# Patient Record
Sex: FEMALE | Race: WHITE | NOT HISPANIC OR LATINO | Employment: FULL TIME | URBAN - METROPOLITAN AREA
[De-identification: names, ages, dates, MRNs, and addresses within clinical notes are randomized per-mention and may not be internally consistent; named-entity substitution may affect disease eponyms.]

---
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
## 2020-06-16 ENCOUNTER — HOSPITAL ENCOUNTER (OUTPATIENT)
Dept: ULTRASOUND IMAGING | Facility: HOSPITAL | Age: 20
Discharge: HOME | End: 2020-06-16
Admitting: INTERNAL MEDICINE

## 2020-06-16 DIAGNOSIS — E06.3 HASHIMOTO'S DISEASE: ICD-10-CM

## 2020-06-16 DIAGNOSIS — E04.2 NONTOXIC MULTINODULAR GOITER: ICD-10-CM

## 2020-06-16 PROCEDURE — 76536 US EXAM OF HEAD AND NECK: CPT

## 2020-06-18 ENCOUNTER — TELEPHONE (OUTPATIENT)
Dept: ENDOCRINOLOGY | Age: 20
End: 2020-06-18

## 2020-06-18 NOTE — TELEPHONE ENCOUNTER
PATIENT LEFT VOICE MAIL   I LEFT MESSAGE FOR HER TO RETURN MY CALL   SHE NEEDS TO RESCHEDULE HER JO ANN APPOINTMENT    THE VOICE MAIL SAID ELLIOT    [phone]

## 2020-06-22 DIAGNOSIS — E06.3 HASHIMOTO'S DISEASE: ICD-10-CM

## 2020-06-22 DIAGNOSIS — E04.2 NONTOXIC MULTINODULAR GOITER: Primary | ICD-10-CM

## 2020-06-22 DIAGNOSIS — E05.20 TOXIC MULTINODUL GOITER: ICD-10-CM

## 2020-07-16 ENCOUNTER — TELEPHONE (OUTPATIENT)
Dept: ENDOCRINOLOGY | Age: 20
End: 2020-07-16

## 2020-07-16 NOTE — TELEPHONE ENCOUNTER
Pt called left a v/m and asked if she could get covid test on her labs that she has Monday she called it anibodies please let me know if this is ok to do.

## 2020-07-21 LAB
ALBUMIN SERPL-MCNC: 4.7 G/DL (ref 3.5–5.2)
ALBUMIN/GLOB SERPL: 2 G/DL
ALP SERPL-CCNC: 71 U/L (ref 39–117)
ALT SERPL-CCNC: 6 U/L (ref 1–33)
AST SERPL-CCNC: 10 U/L (ref 1–32)
BILIRUB SERPL-MCNC: 0.7 MG/DL (ref 0–1.2)
BUN SERPL-MCNC: 9 MG/DL (ref 6–20)
BUN/CREAT SERPL: 11.8 (ref 7–25)
CALCIUM SERPL-MCNC: 9.5 MG/DL (ref 8.6–10.5)
CHLORIDE SERPL-SCNC: 103 MMOL/L (ref 98–107)
CO2 SERPL-SCNC: 24.9 MMOL/L (ref 22–29)
CREAT SERPL-MCNC: 0.76 MG/DL (ref 0.57–1)
GLOBULIN SER CALC-MCNC: 2.3 GM/DL
GLUCOSE SERPL-MCNC: 90 MG/DL (ref 65–99)
POTASSIUM SERPL-SCNC: 3.9 MMOL/L (ref 3.5–5.2)
PROT SERPL-MCNC: 7 G/DL (ref 6–8.5)
SODIUM SERPL-SCNC: 138 MMOL/L (ref 136–145)
T3 SERPL-MCNC: 126 NG/DL (ref 80–200)
T4 FREE SERPL-MCNC: 1.43 NG/DL (ref 0.93–1.7)
THYROPEROXIDASE AB SERPL-ACNC: 28 IU/ML (ref 0–34)
TSH SERPL DL<=0.005 MIU/L-ACNC: 1.81 UIU/ML (ref 0.27–4.2)

## 2020-07-31 ENCOUNTER — OFFICE VISIT (OUTPATIENT)
Dept: ENDOCRINOLOGY | Age: 20
End: 2020-07-31

## 2020-07-31 VITALS
OXYGEN SATURATION: 99 % | WEIGHT: 140.6 LBS | BODY MASS INDEX: 20.83 KG/M2 | RESPIRATION RATE: 20 BRPM | HEART RATE: 83 BPM | SYSTOLIC BLOOD PRESSURE: 110 MMHG | HEIGHT: 69 IN | DIASTOLIC BLOOD PRESSURE: 50 MMHG

## 2020-07-31 DIAGNOSIS — E04.2 MULTINODULAR GOITER: ICD-10-CM

## 2020-07-31 DIAGNOSIS — E06.3 HASHIMOTO'S DISEASE: Primary | ICD-10-CM

## 2020-07-31 PROCEDURE — 99214 OFFICE O/P EST MOD 30 MIN: CPT | Performed by: INTERNAL MEDICINE

## 2020-08-05 ENCOUNTER — RESULTS ENCOUNTER (OUTPATIENT)
Dept: ENDOCRINOLOGY | Age: 20
End: 2020-08-05

## 2020-08-05 DIAGNOSIS — E06.3 HASHIMOTO'S DISEASE: ICD-10-CM

## 2020-08-05 DIAGNOSIS — E05.20 TOXIC MULTINODUL GOITER: ICD-10-CM

## 2020-08-05 PROBLEM — E04.2 MULTINODULAR GOITER: Status: ACTIVE | Noted: 2019-01-07

## 2020-09-18 ENCOUNTER — RESULTS ENCOUNTER (OUTPATIENT)
Dept: ENDOCRINOLOGY | Age: 20
End: 2020-09-18

## 2020-09-18 DIAGNOSIS — E06.3 HASHIMOTO'S DISEASE: ICD-10-CM

## 2021-04-16 ENCOUNTER — BULK ORDERING (OUTPATIENT)
Dept: CASE MANAGEMENT | Facility: OTHER | Age: 21
End: 2021-04-16

## 2021-04-16 DIAGNOSIS — Z23 IMMUNIZATION DUE: ICD-10-CM

## 2021-07-26 ENCOUNTER — OFFICE VISIT (OUTPATIENT)
Dept: ENDOCRINOLOGY | Age: 21
End: 2021-07-26

## 2021-07-26 VITALS
SYSTOLIC BLOOD PRESSURE: 94 MMHG | HEIGHT: 69 IN | HEART RATE: 83 BPM | OXYGEN SATURATION: 98 % | DIASTOLIC BLOOD PRESSURE: 80 MMHG | BODY MASS INDEX: 22.01 KG/M2 | WEIGHT: 148.6 LBS

## 2021-07-26 DIAGNOSIS — E04.2 MULTINODULAR GOITER: ICD-10-CM

## 2021-07-26 DIAGNOSIS — E06.3 HASHIMOTO'S DISEASE: Primary | ICD-10-CM

## 2021-07-26 PROCEDURE — 99214 OFFICE O/P EST MOD 30 MIN: CPT | Performed by: INTERNAL MEDICINE

## 2021-07-26 NOTE — PROGRESS NOTES
"Chief Complaint  Chief Complaint   Patient presents with   • Hashimoto's Thyroiditis   FOLLOW UP/ HYPOTHYROIDISM    Subjective          History of Present Illness    Alaina Young 21 y.o. presents as a F/u patient for the evaluation of Hypothyroidism.     Patient used to see Dr. Lanza in the past, transferred the care to me today.    Not on any thyroid medication. Pt does have TPO antibodies, hence the diagnosis of Hashimoto's disease.  She also underwent serial thyroid ultrasounds to monitor the thyroid nodules.  Never had a biopsy.    Today in clinic she reports that her energy levels are okay, weight has been stable.  No other significant hyper or hypothyroid symptoms.  Denied family history of thyroid disease.    She is on birth control pills which has been helping with her menstrual cycles.      Reviewed primary care physician's/consulting physician documentation and lab results         I have reviewed the patient's allergies, medicines, past medical hx, family hx and social hx in detail.    Objective   Vital Signs:   BP 94/80   Pulse 83   Ht 175.3 cm (69\")   Wt 67.4 kg (148 lb 9.6 oz)   SpO2 98%   BMI 21.94 kg/m²   Physical Exam   General appearance - no distress  Eyes- anicteric sclera  Ear nose and throat-external ears and nose normal.    Respiratory-normal chest on inspection.  No respiratory distress noted.  Skin-no rashes.  Neuro-alert and oriented x3          Result Review :   The following data was reviewed by: Dulce Hartmann MD on 07/26/2021:  Orders Only on 06/22/2020   Component Date Value Ref Range Status   • TSH 07/20/2020 1.810  0.270 - 4.200 uIU/mL Final   • Glucose 07/20/2020 90  65 - 99 mg/dL Final   • BUN 07/20/2020 9  6 - 20 mg/dL Final   • Creatinine 07/20/2020 0.76  0.57 - 1.00 mg/dL Final   • eGFR Non  Am 07/20/2020 97  >60 mL/min/1.73 Final   • eGFR African Am 07/20/2020 118  >60 mL/min/1.73 Final   • BUN/Creatinine Ratio 07/20/2020 11.8  7.0 - 25.0 Final   • Sodium " 07/20/2020 138  136 - 145 mmol/L Final   • Potassium 07/20/2020 3.9  3.5 - 5.2 mmol/L Final   • Chloride 07/20/2020 103  98 - 107 mmol/L Final   • Total CO2 07/20/2020 24.9  22.0 - 29.0 mmol/L Final   • Calcium 07/20/2020 9.5  8.6 - 10.5 mg/dL Final   • Total Protein 07/20/2020 7.0  6.0 - 8.5 g/dL Final   • Albumin 07/20/2020 4.70  3.50 - 5.20 g/dL Final   • Globulin 07/20/2020 2.3  gm/dL Final   • A/G Ratio 07/20/2020 2.0  g/dL Final   • Total Bilirubin 07/20/2020 0.7  0.0 - 1.2 mg/dL Final   • Alkaline Phosphatase 07/20/2020 71  39 - 117 U/L Final   • AST (SGOT) 07/20/2020 10  1 - 32 U/L Final   • ALT (SGPT) 07/20/2020 6  1 - 33 U/L Final   • T3, Total 07/20/2020 126.0  80.0 - 200.0 ng/dl Final    Results may be falsely increased if patient taking Biotin.   • Free T4 07/20/2020 1.43  0.93 - 1.70 ng/dL Final    Results may be falsely increased if patient taking Biotin.   • Thyroid Peroxidase Antibody 07/20/2020 28  0 - 34 IU/mL Final     Data reviewed: Dr. Lanza documentation       Results Review:    I reviewed the patient's new clinical results.     Assessment and Plan    Problem List Items Addressed This Visit        Other    Hashimoto's disease - Primary    Relevant Orders    TSH    T4, Free    T3, Free    Vitamin D 25 Hydroxy    Basic Metabolic Panel    US Thyroid    Multinodular goiter    Relevant Orders    TSH    T4, Free    T3, Free    Vitamin D 25 Hydroxy    Basic Metabolic Panel    US Thyroid        Multinodular goiter  Proceed with a thyroid ultrasound  Reviewed the prior thyroid ultrasound from 2020.    Hashimoto's disease  Check thyroid panel.    Check vitamin D levels.    All the above problems are new for me  Interpreted the blood work-up/imaging results performed by the primary care/consulting physician -    Refills sent to pharmacy    Follow Up     Patient was given instructions and counseling regarding her condition or for health maintenance advice. Please see specific information pulled into the  "AVS if appropriate.       Thank you for asking me to see your patient, Alaina Young in consultation.         Dulce Hartmann MD  07/26/21      EMR Dragon / transcription disclaimer:     \"Dictated utilizing Dragon dictation\".              "

## 2021-07-27 LAB
25(OH)D3+25(OH)D2 SERPL-MCNC: 37.7 NG/ML (ref 30–100)
BUN SERPL-MCNC: 11 MG/DL (ref 6–20)
BUN/CREAT SERPL: 16.2 (ref 7–25)
CALCIUM SERPL-MCNC: 9.7 MG/DL (ref 8.6–10.5)
CHLORIDE SERPL-SCNC: 104 MMOL/L (ref 98–107)
CO2 SERPL-SCNC: 23.8 MMOL/L (ref 22–29)
CREAT SERPL-MCNC: 0.68 MG/DL (ref 0.57–1)
GLUCOSE SERPL-MCNC: 79 MG/DL (ref 65–99)
POTASSIUM SERPL-SCNC: 4.4 MMOL/L (ref 3.5–5.2)
SODIUM SERPL-SCNC: 141 MMOL/L (ref 136–145)
T3FREE SERPL-MCNC: 3 PG/ML (ref 2–4.4)
T4 FREE SERPL-MCNC: 1.05 NG/DL (ref 0.93–1.7)
TSH SERPL DL<=0.005 MIU/L-ACNC: 1.54 UIU/ML (ref 0.27–4.2)

## 2021-08-18 ENCOUNTER — HOSPITAL ENCOUNTER (OUTPATIENT)
Dept: ULTRASOUND IMAGING | Facility: HOSPITAL | Age: 21
Discharge: HOME | End: 2021-08-18
Admitting: INTERNAL MEDICINE

## 2021-08-18 DIAGNOSIS — E06.3 HASHIMOTO'S DISEASE: ICD-10-CM

## 2021-08-18 DIAGNOSIS — E04.2 MULTINODULAR GOITER: ICD-10-CM

## 2021-08-18 PROCEDURE — 76536 US EXAM OF HEAD AND NECK: CPT

## 2022-07-12 ENCOUNTER — TELEPHONE (OUTPATIENT)
Dept: ENDOCRINOLOGY | Age: 22
End: 2022-07-12

## 2022-07-12 DIAGNOSIS — E05.20 TOXIC MULTINODUL GOITER: ICD-10-CM

## 2022-07-12 DIAGNOSIS — E06.3 HASHIMOTO'S DISEASE: Primary | ICD-10-CM

## 2022-07-28 ENCOUNTER — OFFICE VISIT (OUTPATIENT)
Dept: ENDOCRINOLOGY | Age: 22
End: 2022-07-28

## 2022-07-28 VITALS
DIASTOLIC BLOOD PRESSURE: 80 MMHG | SYSTOLIC BLOOD PRESSURE: 120 MMHG | HEIGHT: 69 IN | TEMPERATURE: 98.4 F | WEIGHT: 151.2 LBS | BODY MASS INDEX: 22.39 KG/M2 | OXYGEN SATURATION: 98 % | HEART RATE: 98 BPM

## 2022-07-28 DIAGNOSIS — E06.3 HASHIMOTO'S DISEASE: Primary | ICD-10-CM

## 2022-07-28 DIAGNOSIS — E05.20 TOXIC MULTINODUL GOITER: ICD-10-CM

## 2022-07-28 PROCEDURE — 99214 OFFICE O/P EST MOD 30 MIN: CPT | Performed by: INTERNAL MEDICINE

## 2022-07-28 RX ORDER — CHOLECALCIFEROL (VITAMIN D3) 50 MCG
2000 TABLET ORAL DAILY
Qty: 90 TABLET | Refills: 1 | Status: SHIPPED | OUTPATIENT
Start: 2022-07-28

## 2022-07-28 NOTE — PROGRESS NOTES
"Chief Complaint  Hypothyroidism     Subjective            History of Present Illness  Alaina Young,22 y.o. is here as a F/u patient for the evaluation of Hypothyroidism.      Patient used to see Dr. Lanza in the past, transferred the care to me today.     Not on any thyroid medication. Pt does have TPO antibodies, hence the diagnosis of Hashimoto's disease.  She also underwent serial thyroid ultrasounds to monitor the thyroid nodules.  Never had a biopsy.     Today in clinic she reports that her energy levels are low, weight has been up by a few pounds since last visit.  No other significant hyper or hypothyroid symptoms.  Denied family history of thyroid disease.     Pt has been on nexplanon. Does get the cycles once a month.     Thyroid U/S - Thyroid echotexture is diffusely heterogenous consistent with  Hashimoto's thyroiditis. No increased vascularity seen.  2. Nodules within and/or adjacent to the isthmus as described above,  none qualifies for fine-needle aspiration and are similar to 06/16/2020.  See above description.  3. No new thyroid nodule has developed.    Reviewed primary care physician's/consulting physician documentation and lab results     I have reviewed the patient's allergies, medicines, past medical hx, family hx and social hx in detail.    Objective   Vital Signs:   /80   Pulse 98   Temp 98.4 °F (36.9 °C) (Temporal)   Ht 175.3 cm (69\")   Wt 68.6 kg (151 lb 3.2 oz)   SpO2 98%   BMI 22.33 kg/m²     Physical Exam   General appearance - no distress  Eyes- anicteric sclera  Ear nose and throat-external ears and nose normal.    Respiratory-normal chest on inspection.  No respiratory distress noted.  Skin-no rashes.  Neuro-alert and oriented x3          Result Review :   The following data was reviewed by: Dulce Hartmann MD on 07/28/2022:  Results Encounter on 07/14/2022   Component Date Value Ref Range Status   • TSH 07/14/2022 1.380  0.450 - 4.500 uIU/mL Final   • T3, Free " 07/14/2022 3.8  2.0 - 4.4 pg/mL Final   • Free T4 07/14/2022 1.37  0.82 - 1.77 ng/dL Final   • Glucose 07/14/2022 85  65 - 99 mg/dL Final   • BUN 07/14/2022 8  6 - 20 mg/dL Final   • Creatinine 07/14/2022 0.64  0.57 - 1.00 mg/dL Final   • EGFR Result 07/14/2022 128  >59 mL/min/1.73 Final   • BUN/Creatinine Ratio 07/14/2022 13  9 - 23 Final   • Sodium 07/14/2022 140  134 - 144 mmol/L Final   • Potassium 07/14/2022 4.1  3.5 - 5.2 mmol/L Final   • Chloride 07/14/2022 104  96 - 106 mmol/L Final   • Total CO2 07/14/2022 23  20 - 29 mmol/L Final   • Calcium 07/14/2022 9.3  8.7 - 10.2 mg/dL Final   • Vitamin B-12 07/14/2022 293  232 - 1,245 pg/mL Final   • Folate 07/14/2022 7.8  >3.0 ng/mL Final    Comment: A serum folate concentration of less than 3.1 ng/mL is  considered to represent clinical deficiency.     • 25 Hydroxy, Vitamin D 07/14/2022 30.7  30.0 - 100.0 ng/mL Final    Comment: Vitamin D deficiency has been defined by the [employer] of  Med[employer] and an Endocrine Society practice guideline as a  level of serum 25-OH vitamin D less than 20 ng/mL (1,2).  The Endocrine Society went on to further define vitamin D  insufficiency as a level between 21 and 29 ng/mL (2).  1. IOM ([employer] of [employer]). 2010. Dietary reference     intakes for calcium and D. [place]: The     National Academies Press.  2. Leon MF, Yael NC, Domenico SANTOYO, et al.     Evaluation, treatment, and prevention of vitamin D     deficiency: an Endocrine Society clinical practice     guideline. JCEM. 2011 Jul; 96(7):1911-30.       Data reviewed: PCP notes        I reviewed the patient's new clinical results and mentioned them above in HPI and in plan as well.          Assessment and Plan    Problem List Items Addressed This Visit        Other    Hashimoto's disease - Primary    Relevant Orders    TSH    T3, Free    T4, Free    TSH    T3, Free    T4, Free    Basic Metabolic Panel    Vitamin B12 & Folate    Vitamin D 25 Hydroxy    US  Thyroid      Other Visit Diagnoses     Toxic multinodul goiter        Relevant Orders    TSH    T3, Free    T4, Free    TSH    T3, Free    T4, Free    Basic Metabolic Panel    Vitamin B12 & Folate    Vitamin D 25 Hydroxy    US Thyroid        Hashimoto's disease  Continue to monitor the thyroid panel  Since her thyroid levels are within normal limits holding off on the medication at this time.    Multinodular goiter  Continue to monitor with serial thyroid ultrasound, planning another 1 in August 2023.    Vitamin B12 and vitamin D deficiency  Sent in the prescription for the medications.    Follow Up   No follow-ups on file.    Refills/Meds sent to pharmacy    Interpreted the blood work-up/imaging results performed by the primary care/consulting physician -    Patient was given instructions and counseling regarding her condition or for health maintenance advice. Please see specific information pulled into the AVS if appropriate.

## 2022-08-03 ENCOUNTER — HOSPITAL ENCOUNTER (OUTPATIENT)
Dept: ULTRASOUND IMAGING | Facility: HOSPITAL | Age: 22
Discharge: HOME | End: 2022-08-03
Admitting: INTERNAL MEDICINE

## 2022-08-03 DIAGNOSIS — E05.20 TOXIC MULTINODUL GOITER: ICD-10-CM

## 2022-08-03 DIAGNOSIS — E06.3 HASHIMOTO'S DISEASE: ICD-10-CM

## 2022-08-03 PROCEDURE — 76536 US EXAM OF HEAD AND NECK: CPT

## 2022-09-23 RX ORDER — LANOLIN ALCOHOL/MO/W.PET/CERES
1000 CREAM (GRAM) TOPICAL DAILY
Qty: 30 TABLET | Refills: 11 | Status: SHIPPED | OUTPATIENT
Start: 2022-09-23

## 2023-01-26 ENCOUNTER — LAB (OUTPATIENT)
Dept: ENDOCRINOLOGY | Age: 23
End: 2023-01-26
Payer: COMMERCIAL

## 2023-01-26 DIAGNOSIS — E05.20 TOXIC MULTINODUL GOITER: ICD-10-CM

## 2023-01-26 DIAGNOSIS — E06.3 HASHIMOTO'S DISEASE: ICD-10-CM

## 2023-01-27 LAB
25(OH)D3+25(OH)D2 SERPL-MCNC: 20.1 NG/ML (ref 30–100)
BUN SERPL-MCNC: 13 MG/DL (ref 6–20)
BUN/CREAT SERPL: 19.7 (ref 7–25)
CALCIUM SERPL-MCNC: 9.4 MG/DL (ref 8.6–10.5)
CHLORIDE SERPL-SCNC: 101 MMOL/L (ref 98–107)
CO2 SERPL-SCNC: 26.1 MMOL/L (ref 22–29)
CREAT SERPL-MCNC: 0.66 MG/DL (ref 0.57–1)
EGFRCR SERPLBLD CKD-EPI 2021: 127.4 ML/MIN/1.73
FOLATE SERPL-MCNC: 3.72 NG/ML (ref 4.78–24.2)
GLUCOSE SERPL-MCNC: 81 MG/DL (ref 65–99)
POTASSIUM SERPL-SCNC: 4.4 MMOL/L (ref 3.5–5.2)
SODIUM SERPL-SCNC: 138 MMOL/L (ref 136–145)
T3FREE SERPL-MCNC: 3 PG/ML (ref 2–4.4)
T4 FREE SERPL-MCNC: 1.21 NG/DL (ref 0.93–1.7)
TSH SERPL DL<=0.005 MIU/L-ACNC: 1.2 UIU/ML (ref 0.27–4.2)
VIT B12 SERPL-MCNC: 304 PG/ML (ref 211–946)

## 2023-04-11 ENCOUNTER — OFFICE VISIT (OUTPATIENT)
Dept: ENDOCRINOLOGY | Age: 23
End: 2023-04-11
Payer: COMMERCIAL

## 2023-04-11 VITALS
OXYGEN SATURATION: 98 % | DIASTOLIC BLOOD PRESSURE: 70 MMHG | SYSTOLIC BLOOD PRESSURE: 106 MMHG | HEART RATE: 85 BPM | BODY MASS INDEX: 20.35 KG/M2 | TEMPERATURE: 97.3 F | HEIGHT: 69 IN | WEIGHT: 137.4 LBS

## 2023-04-11 DIAGNOSIS — E06.3 HASHIMOTO'S DISEASE: Primary | ICD-10-CM

## 2023-04-11 PROCEDURE — 99213 OFFICE O/P EST LOW 20 MIN: CPT | Performed by: NURSE PRACTITIONER

## 2023-04-11 NOTE — PROGRESS NOTES
"Chief Complaint  Hashimoto's disease (Pt states that her energy levels have improved now that she is taking vitamin d, she has no family hx of thyroid disease, her weight is stable but shes lost some weight since the end of the year last year, and she still has menstrual cycles that arent regular due to birth control (nexplanon))    Subjective        Alaina Young presents to [facility] ENDOCRINOLOGY  History of Present Illness    Hashimotos 2019  Not currently on medication   Losing weight  Fatigue improved  Last u/s 8/2022  On daily otc vitamin d    Objective   Vital Signs:  /70   Pulse 85   Temp 97.3 °F (36.3 °C) (Temporal)   Ht 175.3 cm (69\")   Wt 62.3 kg (137 lb 6.4 oz)   SpO2 98%   BMI 20.29 kg/m²   Estimated body mass index is 20.29 kg/m² as calculated from the following:    Height as of this encounter: 175.3 cm (69\").    Weight as of this encounter: 62.3 kg (137 lb 6.4 oz).       BMI is within normal parameters. No other follow-up for BMI required.      Physical Exam  Vitals reviewed.   Constitutional:       General: She is not in acute distress.  HENT:      Head: Normocephalic and atraumatic.   Cardiovascular:      Rate and Rhythm: Normal rate.   Pulmonary:      Effort: Pulmonary effort is normal. No respiratory distress.   Musculoskeletal:         General: No signs of injury. Normal range of motion.      Cervical back: Normal range of motion and neck supple.   Skin:     General: Skin is warm and dry.   Neurological:      Mental Status: She is alert and oriented to person, place, and time. Mental status is at baseline.   Psychiatric:         Mood and Affect: Mood normal.         Behavior: Behavior normal.         Thought Content: Thought content normal.         Judgment: Judgment normal.        Result Review :  The following data was reviewed by: BRENDA Costa on 04/11/2023:  Common labs        7/14/2022    15:44 1/26/2023    15:39   Common Labs   Glucose 85   81   "   BUN 8   13     Creatinine 0.64   0.66     Sodium 140   138     Potassium 4.1   4.4     Chloride 104   101     Calcium 9.3   9.4                    Assessment and Plan   Diagnoses and all orders for this visit:    1. Hashimoto's disease (Primary)             Follow Up   Return in about 1 year (around 4/11/2024).     Labs favorable  No changes made today  F/u u/s 8/2024    Patient was given instructions and counseling regarding her condition or for health maintenance advice. Please see specific information pulled into the AVS if appropriate.     Philomena Millard, APRN

## 2024-04-08 ENCOUNTER — TELEPHONE (OUTPATIENT)
Dept: ENDOCRINOLOGY | Age: 24
End: 2024-04-08
Payer: COMMERCIAL

## 2024-04-08 DIAGNOSIS — E06.3 HASHIMOTO'S DISEASE: Primary | ICD-10-CM

## 2024-04-11 ENCOUNTER — LAB (OUTPATIENT)
Dept: ENDOCRINOLOGY | Age: 24
End: 2024-04-11
Payer: COMMERCIAL

## 2024-04-11 DIAGNOSIS — E06.3 HASHIMOTO'S DISEASE: ICD-10-CM

## 2024-04-12 LAB
T3FREE SERPL-MCNC: 3.2 PG/ML (ref 2–4.4)
T4 FREE SERPL-MCNC: 1.23 NG/DL (ref 0.93–1.7)
TSH SERPL DL<=0.005 MIU/L-ACNC: 1.29 UIU/ML (ref 0.27–4.2)

## 2024-04-18 ENCOUNTER — OFFICE VISIT (OUTPATIENT)
Dept: ENDOCRINOLOGY | Age: 24
End: 2024-04-18
Payer: COMMERCIAL

## 2024-04-18 VITALS
DIASTOLIC BLOOD PRESSURE: 72 MMHG | WEIGHT: 146 LBS | HEIGHT: 69 IN | BODY MASS INDEX: 21.62 KG/M2 | SYSTOLIC BLOOD PRESSURE: 104 MMHG | OXYGEN SATURATION: 98 % | HEART RATE: 76 BPM

## 2024-04-18 DIAGNOSIS — E06.3 HASHIMOTO'S DISEASE: Primary | ICD-10-CM

## 2024-04-18 PROCEDURE — 99213 OFFICE O/P EST LOW 20 MIN: CPT | Performed by: NURSE PRACTITIONER

## 2024-04-18 NOTE — PROGRESS NOTES
"Chief Complaint  Hashimoto's Thyroiditis    Subjective        Alaina Young presents to [facility] ENDOCRINOLOGY  History of Present Illness    Hashimotos 2019  Not currently on medication   Next u/s due 8/2024  Slight weight gain with new nexplanon placed   Feeling well overall   No complaints today    Objective   Vital Signs:  /72 (BP Location: Right arm, Patient Position: Sitting, Cuff Size: Adult)   Pulse 76   Ht 175.3 cm (69.02\")   Wt 66.2 kg (146 lb)   SpO2 98%   BMI 21.55 kg/m²   Estimated body mass index is 21.55 kg/m² as calculated from the following:    Height as of this encounter: 175.3 cm (69.02\").    Weight as of this encounter: 66.2 kg (146 lb).       BMI is within normal parameters. No other follow-up for BMI required.      Physical Exam  Vitals reviewed.   Constitutional:       General: She is not in acute distress.  HENT:      Head: Normocephalic and atraumatic.   Cardiovascular:      Rate and Rhythm: Normal rate.   Pulmonary:      Effort: Pulmonary effort is normal. No respiratory distress.   Musculoskeletal:         General: No signs of injury. Normal range of motion.      Cervical back: Normal range of motion and neck supple.   Skin:     General: Skin is warm and dry.   Neurological:      Mental Status: She is alert and oriented to person, place, and time. Mental status is at baseline.   Psychiatric:         Mood and Affect: Mood normal.         Behavior: Behavior normal.         Thought Content: Thought content normal.         Judgment: Judgment normal.          Result Review :    The following data was reviewed by: BRENDA Costa on 04/18/2024:                 Assessment and Plan     Diagnoses and all orders for this visit:    1. Hashimoto's disease (Primary)  -     US Thyroid; Future             Follow Up     No follow-ups on file.    Thyroid u/s before she moves   No other action needed at this time     Patient was given instructions and counseling " regarding her condition or for health maintenance advice. Please see specific information pulled into the AVS if appropriate.     BRENDA Costa

## 2024-04-25 ENCOUNTER — HOSPITAL ENCOUNTER (OUTPATIENT)
Dept: ULTRASOUND IMAGING | Facility: HOSPITAL | Age: 24
Discharge: HOME | End: 2024-04-25
Admitting: NURSE PRACTITIONER
Payer: COMMERCIAL

## 2024-04-25 DIAGNOSIS — E06.3 HASHIMOTO'S DISEASE: ICD-10-CM

## 2024-04-25 PROCEDURE — 76536 US EXAM OF HEAD AND NECK: CPT
